# Patient Record
Sex: MALE | Race: WHITE | NOT HISPANIC OR LATINO | URBAN - METROPOLITAN AREA
[De-identification: names, ages, dates, MRNs, and addresses within clinical notes are randomized per-mention and may not be internally consistent; named-entity substitution may affect disease eponyms.]

---

## 2024-11-15 ENCOUNTER — OFFICE VISIT (OUTPATIENT)
Dept: URBAN - METROPOLITAN AREA CLINIC 78 | Facility: CLINIC | Age: 74
End: 2024-11-15
Payer: COMMERCIAL

## 2024-11-15 ENCOUNTER — DASHBOARD ENCOUNTERS (OUTPATIENT)
Age: 74
End: 2024-11-15

## 2024-11-15 VITALS
WEIGHT: 157.8 LBS | DIASTOLIC BLOOD PRESSURE: 70 MMHG | SYSTOLIC BLOOD PRESSURE: 124 MMHG | HEIGHT: 69 IN | RESPIRATION RATE: 16 BRPM | BODY MASS INDEX: 23.37 KG/M2 | HEART RATE: 52 BPM | TEMPERATURE: 98 F

## 2024-11-15 DIAGNOSIS — Z80.0 FAMILY HX OF COLON CANCER: ICD-10-CM

## 2024-11-15 DIAGNOSIS — R10.12 ABDOMINAL BURNING SENSATION IN LEFT UPPER QUADRANT: ICD-10-CM

## 2024-11-15 DIAGNOSIS — R13.19 ESOPHAGEAL DYSPHAGIA: ICD-10-CM

## 2024-11-15 DIAGNOSIS — K80.20 CALCULUS OF GALLBLADDER WITHOUT CHOLECYSTITIS WITHOUT OBSTRUCTION: ICD-10-CM

## 2024-11-15 DIAGNOSIS — K83.8 COMMON BILE DUCT DILATATION: ICD-10-CM

## 2024-11-15 DIAGNOSIS — R10.11 ABDOMINAL BURNING SENSATION IN RIGHT UPPER QUADRANT: ICD-10-CM

## 2024-11-15 DIAGNOSIS — K82.8 GALLBLADDER SLUDGE: ICD-10-CM

## 2024-11-15 PROBLEM — 305058001: Status: ACTIVE | Noted: 2024-11-15

## 2024-11-15 PROBLEM — 27123005: Status: ACTIVE | Noted: 2024-11-15

## 2024-11-15 PROBLEM — 312824007: Status: ACTIVE | Noted: 2024-11-15

## 2024-11-15 PROBLEM — 70342003: Status: ACTIVE | Noted: 2024-11-15

## 2024-11-15 PROBLEM — 40890009: Status: ACTIVE | Noted: 2024-11-15

## 2024-11-15 PROBLEM — 83132003: Status: ACTIVE | Noted: 2024-11-15

## 2024-11-15 PROCEDURE — 99204 OFFICE O/P NEW MOD 45 MIN: CPT

## 2024-11-15 RX ORDER — OMEPRAZOLE 40 MG/1
1 CAPSULE 30 MINUTES BEFORE MORNING MEAL CAPSULE, DELAYED RELEASE ORAL ONCE A DAY
Qty: 90 | Refills: 3 | OUTPATIENT
Start: 2024-11-15

## 2024-11-15 NOTE — HPI-TODAY'S VISIT:
74-year-old male, new patient, presents for evaluation of abdominal pain.  He denies N/V/GERD/abn weight loss/change in BH. He does have a BM QD, no blood or mucus seen in the stool.   He has noticed that food gets stuck in his throat, which has happened 2x in the past 2 weeks,  and would occur previously, but would be rare. Lasting for 5 seconds, he waits /drinks water for relief.   He has has RUQ and LUQ abd discomfort, which he says feels like he has a "belt strapped tightly along his rib cage." It is constant, has been ongoing for the past month, which he believes was due to increased sugar/ETOH intake at the time. It has woken him up in the middle of the night.  He is not able to scale it on a 1-10 rating.   He reports improvement in pain with meals, however, the discomfort returns some time after.  He had a negative h.pylori breath test with his PCP recently.   Prior EGD: none Prior Colonscopy: in 2023 with  Dr. Jone Reynoso, normal.  Family Hx: Mother w/ Colon Ca diagnosed in her 60s, Father with Pancreatic Ca diagnosed at 79 CP/SOB: none Recent Cardiology or Pulmonology Eval: Cardiologist is Dr. Kern, for CAD, no pulm Dr.  Use of BT/NSAID/ GLP1 use: none  Last Tuesday, he had an anual physical with his PCP, I have reviewed outside records via pt's phone  Abdominal U/S 10/29/2024: 6.7 centimeter CBD is prominent and follow-up ultrasound suggested.  Cholelithiasis.  Gallbladder sludge.  No evidence of AAA.

## 2024-12-10 ENCOUNTER — TELEPHONE ENCOUNTER (OUTPATIENT)
Dept: URBAN - METROPOLITAN AREA CLINIC 78 | Facility: CLINIC | Age: 74
End: 2024-12-10

## 2024-12-13 ENCOUNTER — LAB OUTSIDE AN ENCOUNTER (OUTPATIENT)
Dept: URBAN - METROPOLITAN AREA CLINIC 78 | Facility: CLINIC | Age: 74
End: 2024-12-13

## 2024-12-24 ENCOUNTER — OFFICE VISIT (OUTPATIENT)
Dept: URBAN - METROPOLITAN AREA SURGERY CENTER 15 | Facility: SURGERY CENTER | Age: 74
End: 2024-12-24

## 2025-01-07 ENCOUNTER — OFFICE VISIT (OUTPATIENT)
Dept: URBAN - METROPOLITAN AREA SURGERY CENTER 15 | Facility: SURGERY CENTER | Age: 75
End: 2025-01-07

## 2025-01-08 ENCOUNTER — OFFICE VISIT (OUTPATIENT)
Dept: URBAN - METROPOLITAN AREA CLINIC 78 | Facility: CLINIC | Age: 75
End: 2025-01-08

## 2025-01-14 ENCOUNTER — OFFICE VISIT (OUTPATIENT)
Dept: URBAN - METROPOLITAN AREA CLINIC 77 | Facility: CLINIC | Age: 75
End: 2025-01-14

## 2025-01-22 ENCOUNTER — OFFICE VISIT (OUTPATIENT)
Dept: URBAN - METROPOLITAN AREA CLINIC 78 | Facility: CLINIC | Age: 75
End: 2025-01-22

## 2025-02-06 ENCOUNTER — CLAIMS CREATED FROM THE CLAIM WINDOW (OUTPATIENT)
Dept: URBAN - METROPOLITAN AREA SURGERY CENTER 15 | Facility: SURGERY CENTER | Age: 75
End: 2025-02-06
Payer: COMMERCIAL

## 2025-02-06 ENCOUNTER — CLAIMS CREATED FROM THE CLAIM WINDOW (OUTPATIENT)
Dept: URBAN - METROPOLITAN AREA CLINIC 4 | Facility: CLINIC | Age: 75
End: 2025-02-06
Payer: COMMERCIAL

## 2025-02-06 DIAGNOSIS — K31.89 OTHER DISEASES OF STOMACH AND DUODENUM: ICD-10-CM

## 2025-02-06 DIAGNOSIS — K21.9 ACID REFLUX: ICD-10-CM

## 2025-02-06 DIAGNOSIS — R13.19 CERVICAL DYSPHAGIA: ICD-10-CM

## 2025-02-06 DIAGNOSIS — R10.13 ABDOMINAL DISCOMFORT, EPIGASTRIC: ICD-10-CM

## 2025-02-06 DIAGNOSIS — K21.9 GASTRO-ESOPHAGEAL REFLUX DISEASE WITHOUT ESOPHAGITIS: ICD-10-CM

## 2025-02-06 DIAGNOSIS — K22.89 OTHER SPECIFIED DISEASE OF ESOPHAGUS: ICD-10-CM

## 2025-02-06 PROCEDURE — 43239 EGD BIOPSY SINGLE/MULTIPLE: CPT | Performed by: INTERNAL MEDICINE

## 2025-02-06 PROCEDURE — 88305 TISSUE EXAM BY PATHOLOGIST: CPT | Performed by: PATHOLOGY

## 2025-02-06 PROCEDURE — 88312 SPECIAL STAINS GROUP 1: CPT | Performed by: PATHOLOGY

## 2025-02-06 PROCEDURE — 00731 ANES UPR GI NDSC PX NOS: CPT | Performed by: NURSE ANESTHETIST, CERTIFIED REGISTERED

## 2025-02-21 ENCOUNTER — TELEPHONE ENCOUNTER (OUTPATIENT)
Dept: URBAN - METROPOLITAN AREA CLINIC 78 | Facility: CLINIC | Age: 75
End: 2025-02-21